# Patient Record
Sex: MALE | Race: WHITE | NOT HISPANIC OR LATINO | Employment: FULL TIME | URBAN - NONMETROPOLITAN AREA
[De-identification: names, ages, dates, MRNs, and addresses within clinical notes are randomized per-mention and may not be internally consistent; named-entity substitution may affect disease eponyms.]

---

## 2019-03-07 ENCOUNTER — TELEPHONE (OUTPATIENT)
Dept: UROLOGY | Facility: CLINIC | Age: 29
End: 2019-03-07

## 2019-03-07 ENCOUNTER — OFFICE VISIT (OUTPATIENT)
Dept: UROLOGY | Facility: CLINIC | Age: 29
End: 2019-03-07

## 2019-03-07 VITALS — WEIGHT: 260 LBS | BODY MASS INDEX: 30.7 KG/M2 | HEIGHT: 77 IN

## 2019-03-07 DIAGNOSIS — R79.89 LOW TESTOSTERONE: Primary | ICD-10-CM

## 2019-03-07 DIAGNOSIS — N52.9 ED (ERECTILE DYSFUNCTION) OF ORGANIC ORIGIN: ICD-10-CM

## 2019-03-07 PROCEDURE — 99204 OFFICE O/P NEW MOD 45 MIN: CPT | Performed by: UROLOGY

## 2019-03-07 RX ORDER — TESTOSTERONE CYPIONATE 200 MG/ML
INJECTION, SOLUTION INTRAMUSCULAR
Qty: 10 ML | Refills: 2 | Status: SHIPPED | OUTPATIENT
Start: 2019-03-07 | End: 2019-04-18 | Stop reason: SDUPTHER

## 2019-03-07 NOTE — PROGRESS NOTES
Chief Complaint:          Chief Complaint   Patient presents with   • Low Testosterone     New Patient        HPI:   28 y.o. male.  28-year-old white male referred with low testosterone.  He went to [facility] he has anxiety, panic attacks, he has 2 children he is not interested in more fertility.  He was placed on Zoloft.  His erections are essentially gone.  He tried testosterone previously and it was great but he also has a history of Winstrol in the gym.  He has anxiety and panic attacks his level was 323.  He has had eye surgery no other medical problems.  Low Testosterone this pleasant male patient presents today with signs and symptoms that are consistent with low testosterone he has positive Isaias questionnaire by history this includes both the sexual and nonsexual side effects.  Sexual side effects include inability to achieve and maintain an erection, in ability to maintain his erection and decreased interest and sexual activity.  Nonsexual symptomatology includes fatigue, difficulty completing a job, tiredness.  He has a discussion of the various forms testosterone available including parenteral, topical, and the form of a patch.  We discussed the efficacy of the gels, and the injections.  As well as the cost and benefits analysis.  We discussed the the studies a talked about heart disease and its effect on prostate cancer both of which are negligible.  He gives verbal consent to proceed with treatment.  He understands the risks and benefits of length he also completed his attempts at fertility he understands the partial effect on spermatogenesis    Past Medical History:      History reviewed. No pertinent past medical history.      Current Meds:     Current Outpatient Medications   Medication Sig Dispense Refill   • sertraline (ZOLOFT) 50 MG tablet Take 50 mg by mouth Daily.       No current facility-administered medications for this visit.         Allergies:      No Known Allergies     Past Surgical  History:     History reviewed. No pertinent surgical history.      Social History:     Social History     Socioeconomic History   • Marital status:      Spouse name: Not on file   • Number of children: Not on file   • Years of education: Not on file   • Highest education level: Not on file   Social Needs   • Financial resource strain: Not on file   • Food insecurity - worry: Not on file   • Food insecurity - inability: Not on file   • Transportation needs - medical: Not on file   • Transportation needs - non-medical: Not on file   Occupational History   • Not on file   Tobacco Use   • Smoking status: Never Smoker   • Smokeless tobacco: Never Used   Substance and Sexual Activity   • Alcohol use: Yes     Frequency: Never   • Drug use: No   • Sexual activity: Not on file   Other Topics Concern   • Not on file   Social History Narrative   • Not on file       Family History:     Family History   Problem Relation Age of Onset   • Hypertension Father    • Heart disease Father    • Cancer Father    • Hypertension Mother    • Heart disease Mother        Review of Systems:     Review of Systems   Constitutional: Negative.    HENT: Negative.    Eyes: Negative.    Respiratory: Negative.    Cardiovascular: Negative.    Gastrointestinal: Negative.    Endocrine: Negative.    Musculoskeletal: Negative.    Allergic/Immunologic: Negative.    Neurological: Negative.    Hematological: Negative.    Psychiatric/Behavioral: Negative.        Physical Exam:     Physical Exam   Constitutional: He is oriented to person, place, and time. He appears well-developed and well-nourished.   HENT:   Head: Normocephalic and atraumatic.   Eyes: Conjunctivae and EOM are normal. Pupils are equal, round, and reactive to light.   Neck: Normal range of motion.   Cardiovascular: Normal rate, regular rhythm, normal heart sounds and intact distal pulses.   Pulmonary/Chest: Effort normal and breath sounds normal.   Abdominal: Soft. Bowel sounds are  normal.   Genitourinary:   Genitourinary Comments:  soft nontender abdomen with no organomegaly, rigidity, or tenderness.  He has normal external genitalia and uncircumcised phallus with a freely movable foreskin bilaterally descended testes without masses there is no inguinal hernias adenopathy or abnormalities he had good rectal tone and a large smooth firm prostate.  There is no nodularity or any suspicious rectal abnormalities     Musculoskeletal: Normal range of motion.   Neurological: He is alert and oriented to person, place, and time. He has normal reflexes.   Skin: Skin is warm and dry.   Psychiatric: He has a normal mood and affect. His behavior is normal. Judgment and thought content normal.   Nursing note and vitals reviewed.      I have reviewed the following portions of the patient's history: allergies, current medications, past family history, past medical history, past social history, past surgical history, problem list and ROS and confirm it's accurate.      Procedure:       Assessment/Plan:   Low TestosteroneThis pleasant male patient presents today with signs and symptoms that are consistent with low testosterone he has positive Isaias questionnaire by history this includes both the sexual and nonsexual side effects.  Sexual side effects include inability to achieve and maintain an erection, in ability to maintain his erection and decreased interest and sexual activity.  Nonsexual symptomatology includes fatigue, difficulty completing a job, tiredness.  He has a discussion of the various forms testosterone available including parenteral, topical, and the form of a patch.  We discussed the efficacy of the gels, and the injections.  As well as the cost and benefits analysis.  We discussed the the studies a talked about heart disease and its effect on prostate cancer both of which are negligible.  He gives verbal consent to proceed with treatment.  He understands the risks and benefits of length he  also completed his attempts at fertility he understands the partial effect on spermatogenesis  Erectile dysfunction-we discussed the anatomy and physiology of the penis and the endothelium.  We discussed the various forms of erectile dysfunction including peripheral vascular occlusive disease, postoperative, secondary to radiation treatments of the prostate, and arterial inflow.  We discussed the various treatment options available including oral medication and its various forms.  We discussed the use of both generic and non-generic Viagra.  We discussed Cialis and a longer half-life of 17 hours as well as the other 2 medications.  We discussed cost involved with this including the fact that the generic is much cheaper but is taken has multiple pills because they are 20 mg dosages.  We did discuss the other alternatives including Penile injections, vacuum erection devices and surgical intervention reserved for only the most severe cases.  We discussed the need for testosterone in about 20% of cases of erectile dysfunction.    Patient's Body mass index is 30.83 kg/m². BMI is above normal parameters. Recommendations include: educational material.          This document has been electronically signed by ADELIA ESCOBEDO MD March 7, 2019 10:30 AM

## 2019-03-08 PROBLEM — N52.9 ED (ERECTILE DYSFUNCTION) OF ORGANIC ORIGIN: Status: ACTIVE | Noted: 2019-03-08

## 2019-03-08 PROBLEM — R79.89 LOW TESTOSTERONE: Status: ACTIVE | Noted: 2019-03-08

## 2019-03-15 ENCOUNTER — PRIOR AUTHORIZATION (OUTPATIENT)
Dept: UROLOGY | Facility: CLINIC | Age: 29
End: 2019-03-15

## 2019-03-15 NOTE — TELEPHONE ENCOUNTER
PA FOR TESTOSTERONE WAS SENT TO [facility]AN TODAY AND APPROVED.     CALLED PATIENT AND HE VOICED UNDERSTANDING.

## 2019-04-18 ENCOUNTER — OFFICE VISIT (OUTPATIENT)
Dept: UROLOGY | Facility: CLINIC | Age: 29
End: 2019-04-18

## 2019-04-18 VITALS — HEIGHT: 77 IN | BODY MASS INDEX: 30.83 KG/M2

## 2019-04-18 DIAGNOSIS — R79.89 LOW TESTOSTERONE: ICD-10-CM

## 2019-04-18 DIAGNOSIS — N62 PSEUDOGYNECOMASTIA: Primary | ICD-10-CM

## 2019-04-18 PROCEDURE — 99214 OFFICE O/P EST MOD 30 MIN: CPT | Performed by: UROLOGY

## 2019-04-18 RX ORDER — ANASTROZOLE 1 MG/1
1 TABLET ORAL WEEKLY
Qty: 12 TABLET | Refills: 2 | Status: SHIPPED | OUTPATIENT
Start: 2019-04-18 | End: 2019-11-07 | Stop reason: SDUPTHER

## 2019-04-18 RX ORDER — TESTOSTERONE CYPIONATE 200 MG/ML
INJECTION, SOLUTION INTRAMUSCULAR
Qty: 10 ML | Refills: 2 | Status: SHIPPED | OUTPATIENT
Start: 2019-04-18 | End: 2019-11-07 | Stop reason: SDUPTHER

## 2019-04-18 NOTE — PROGRESS NOTES
Chief Complaint:          Chief Complaint   Patient presents with   • Low Testosterone       HPI:   28 y.o. male referred with low testosterone.  He went to [facility] he has anxiety, panic attacks, he has 2 children he is not interested in more fertility.  He was placed on Zoloft.  His erections are essentially gone.  He tried testosterone previously and it was great but he also has a history of Winstrol in the gym.  He has anxiety and panic attacks his level was 323.  He has had eye surgery no other medical problems.  Low Testosterone this pleasant male patient presents today with signs and symptoms that are consistent with low testosterone he has positive Isaias questionnaire by history this includes both the sexual and nonsexual side effects.  Sexual side effects include inability to achieve and maintain an erection, in ability to maintain his erection and decreased interest and sexual activity.  Nonsexual symptomatology includes fatigue, difficulty completing a job, tiredness.  He has a discussion of the various forms testosterone available including parenteral, topical, and the form of a patch.  We discussed the efficacy of the gels, and the injections.  As well as the cost and benefits analysis.  We discussed the the studies a talked about heart disease and its effect on prostate cancer both of which are negligible.  He gives verbal consent to proceed with treatment.  He understands the risks and benefits of length he also completed his attempts at fertility he understands the partial effect on spermatogenesis.  He returns today.  He is actually doing great he is lost weight but now is complaining of some breast enlargement consistent with pseudogynecomastia.  I am going to get appropriate labs but I am going to start him on aerobics.  I discussed this diagnosis with him.  He continues to lose weight he has great erections he is very pleased with the treatment today      Past Medical History:      History  reviewed. No pertinent past medical history.      Current Meds:     Current Outpatient Medications   Medication Sig Dispense Refill   • sertraline (ZOLOFT) 50 MG tablet Take 50 mg by mouth Daily.     • Syringe, Disposable, 3 ML misc Use 3 ml syringe with a 25 gauge  5/8 inch needle 24 each 6   • Testosterone Cypionate (DEPO-TESTOSTERONE) 200 MG/ML injection He is to use 1/2 cc every Monday and Thursday SQ 10 mL 2     No current facility-administered medications for this visit.         Allergies:      No Known Allergies     Past Surgical History:     No past surgical history on file.      Social History:     Social History     Socioeconomic History   • Marital status:      Spouse name: Not on file   • Number of children: Not on file   • Years of education: Not on file   • Highest education level: Not on file   Tobacco Use   • Smoking status: Never Smoker   • Smokeless tobacco: Never Used   Substance and Sexual Activity   • Alcohol use: Yes     Frequency: Never   • Drug use: No       Family History:     Family History   Problem Relation Age of Onset   • Hypertension Father    • Heart disease Father    • Cancer Father    • Hypertension Mother    • Heart disease Mother        Review of Systems:     Review of Systems   Constitutional: Negative.    HENT: Negative.    Eyes: Negative.    Respiratory: Negative.    Cardiovascular: Negative.    Gastrointestinal: Negative.    Endocrine: Negative.    Musculoskeletal: Negative.    Allergic/Immunologic: Negative.    Neurological: Negative.    Hematological: Negative.    Psychiatric/Behavioral: Negative.        Physical Exam:     Physical Exam   Constitutional: He is oriented to person, place, and time. He appears well-developed and well-nourished.   HENT:   Head: Normocephalic and atraumatic.   Eyes: Conjunctivae and EOM are normal. Pupils are equal, round, and reactive to light.   Neck: Normal range of motion.   Cardiovascular: Normal rate, regular rhythm, normal heart  sounds and intact distal pulses.   Pulmonary/Chest: Effort normal and breath sounds normal.   Abdominal: Soft. Bowel sounds are normal.   Musculoskeletal: Normal range of motion.   Neurological: He is alert and oriented to person, place, and time. He has normal reflexes.   Skin: Skin is warm and dry.   Psychiatric: He has a normal mood and affect. His behavior is normal. Judgment and thought content normal.   Nursing note and vitals reviewed.      I have reviewed the following portions of the patient's history: allergies, current medications, past family history, past medical history, past social history, past surgical history, problem list and ROS and confirm it's accurate.      Procedure:       Assessment/Plan:   Low testosterone:  patient is here for follow-up.  Since beginning the medication he's been very pleased.  He reports a dramatic improvement in his erections, ability to achieve and maintain an erection, improvement in libido, increase in frequency of morning erections, a noticeable weight loss consistent with the treatment.  No development of breast problems or abnormalities.  He's going to have appropriate safety laboratory parameters checked.   He understands that the new data implicates testosterone with the development of prostate cancer and this is all but been disproven and the medical literature as well as the risks of cardiovascular disease which is actually also been disproven.  He understands that while he is a candidate for topical therapy if he is in contact with children this is not an option because it's been shown to accentuate genitalia development at an early age that this frequently irreversible.  He also understands this is a controlled substance and as such will not be prescribed without appropriate follow-up and appropriate laboratory investigation.  He understands effects on spermatogenesis including the fact that this is not always completely reversible and not always completely  limited his ability to father a child.  He has demonstrated facility in the technique of both intramuscular and subcutaneous injection.  And has been taught sterility one drawing up the medication.  Pseudogynecomastia-recommend a course of anastrozole I discussed the pathophysiology of this with the patient    Patient's Body mass index is 30.83 kg/m². BMI is above normal parameters. Recommendations include: educational material.          This document has been electronically signed by ADELIA ESCOBEDO MD April 18, 2019 9:50 AM

## 2019-04-20 PROBLEM — N62 PSEUDOGYNECOMASTIA: Status: ACTIVE | Noted: 2019-04-20

## 2019-11-07 ENCOUNTER — OFFICE VISIT (OUTPATIENT)
Dept: UROLOGY | Facility: CLINIC | Age: 29
End: 2019-11-07

## 2019-11-07 VITALS — HEIGHT: 77 IN | BODY MASS INDEX: 33.42 KG/M2 | WEIGHT: 283 LBS

## 2019-11-07 DIAGNOSIS — E29.1 HYPOGONADISM IN MALE: Primary | ICD-10-CM

## 2019-11-07 DIAGNOSIS — R79.89 LOW TESTOSTERONE: ICD-10-CM

## 2019-11-07 DIAGNOSIS — E28.0 HYPERESTROGENISM: ICD-10-CM

## 2019-11-07 LAB
DEPRECATED RDW RBC AUTO: 50.3 FL (ref 37–54)
ERYTHROCYTE [DISTWIDTH] IN BLOOD BY AUTOMATED COUNT: 13.8 % (ref 12.3–15.4)
ESTRADIOL SERPL HS-MCNC: 7.5 PG/ML
HCT VFR BLD AUTO: 49.1 % (ref 37.5–51)
HGB BLD-MCNC: 16.8 G/DL (ref 13–17.7)
MCH RBC QN AUTO: 33.9 PG (ref 26.6–33)
MCHC RBC AUTO-ENTMCNC: 34.2 G/DL (ref 31.5–35.7)
MCV RBC AUTO: 99.2 FL (ref 79–97)
PLATELET # BLD AUTO: 173 10*3/MM3 (ref 140–450)
PMV BLD AUTO: 11.8 FL (ref 6–12)
PSA SERPL-MCNC: 0.49 NG/ML (ref 0–4)
RBC # BLD AUTO: 4.95 10*6/MM3 (ref 4.14–5.8)
TESTOST SERPL-MCNC: 704 NG/DL (ref 249–836)
WBC NRBC COR # BLD: 7.07 10*3/MM3 (ref 3.4–10.8)

## 2019-11-07 PROCEDURE — 85027 COMPLETE CBC AUTOMATED: CPT | Performed by: NURSE PRACTITIONER

## 2019-11-07 PROCEDURE — 84403 ASSAY OF TOTAL TESTOSTERONE: CPT | Performed by: NURSE PRACTITIONER

## 2019-11-07 PROCEDURE — 82670 ASSAY OF TOTAL ESTRADIOL: CPT | Performed by: NURSE PRACTITIONER

## 2019-11-07 PROCEDURE — 99213 OFFICE O/P EST LOW 20 MIN: CPT | Performed by: UROLOGY

## 2019-11-07 PROCEDURE — 36415 COLL VENOUS BLD VENIPUNCTURE: CPT | Performed by: UROLOGY

## 2019-11-07 PROCEDURE — 84153 ASSAY OF PSA TOTAL: CPT | Performed by: NURSE PRACTITIONER

## 2019-11-07 RX ORDER — TESTOSTERONE CYPIONATE 200 MG/ML
INJECTION, SOLUTION INTRAMUSCULAR
Qty: 10 ML | Refills: 2 | Status: SHIPPED | OUTPATIENT
Start: 2019-11-07 | End: 2021-02-09 | Stop reason: ALTCHOICE

## 2019-11-07 RX ORDER — ANASTROZOLE 1 MG/1
1 TABLET ORAL WEEKLY
Qty: 12 TABLET | Refills: 2 | Status: SHIPPED | OUTPATIENT
Start: 2019-11-07 | End: 2021-02-09

## 2019-11-07 NOTE — PROGRESS NOTES
"Chief Complaint:          Chief Complaint   Patient presents with   • Hypogonadism     6 month f/u        HPI:   28 y.o. male returns today doing great.  He has had some weight gain and want to go ahead and make the addition of an estradiol to rule out hyperestrogenism.  Patient returns today for follow-up.  He is been on testosterone replacement therapy.  He reports a dramatic improvement in his Isaias questionnaire: -ISAIAS-androgen deficiency in the age male questionnaire  The patient was queried regarding the androgen deficiency in the age male questionnaire.  This is a validated questionnaire that was performed on a set of 314 [place] male physicians when it was positive it correlated directly with a 94% chance of low testosterone.  Patient indicates there is a decrease in libido or sex drive, a lack of energy, Decreased  strength and endurance, a decreased \"enjoyment of life\", sad and grumpy feelings with significant difficulty maintaining erections.  He is also been a recent deterioration regarding work performance.  He reports weight loss.  He has good facility and the use of subcutaneous and intramuscular injections as well as comfort level and using the medication in a sterile fashion.  He understands he should use only the prescribed dose.  He's here for appropriate lab monitoring regarding this.  He understands this is a controlled substance and therefore must be watched closely will not be refilled and the medical loss or miss calculation of the dose.  He is very happy with the treatment and therefore wants to continue it.      Past Medical History:      History reviewed. No pertinent past medical history.      Current Meds:     Current Outpatient Medications   Medication Sig Dispense Refill   • anastrozole (ARIMIDEX) 1 MG tablet Take 1 tablet by mouth 1 (One) Time Per Week. 12 tablet 2   • sertraline (ZOLOFT) 50 MG tablet Take 50 mg by mouth Daily.     • Syringe, Disposable, 3 ML misc Use 3 ml syringe " with a 25 gauge  5/8 inch needle 24 each 6   • Testosterone Cypionate (DEPO-TESTOSTERONE) 200 MG/ML injection He is to use 1/2 cc every Monday and Thursday SQ 10 mL 2     No current facility-administered medications for this visit.         Allergies:      No Known Allergies     Past Surgical History:     History reviewed. No pertinent surgical history.      Social History:     Social History     Socioeconomic History   • Marital status:      Spouse name: Not on file   • Number of children: Not on file   • Years of education: Not on file   • Highest education level: Not on file   Tobacco Use   • Smoking status: Never Smoker   • Smokeless tobacco: Never Used   Substance and Sexual Activity   • Alcohol use: Yes     Frequency: Never   • Drug use: No       Family History:     Family History   Problem Relation Age of Onset   • Hypertension Father    • Heart disease Father    • Cancer Father    • Hypertension Mother    • Heart disease Mother        Review of Systems:     Review of Systems   Constitutional: Negative.    HENT: Negative.    Eyes: Negative.    Respiratory: Negative.    Cardiovascular: Negative.    Gastrointestinal: Negative.    Endocrine: Negative.    Musculoskeletal: Negative.    Allergic/Immunologic: Negative.    Neurological: Negative.    Hematological: Negative.    Psychiatric/Behavioral: Negative.        Physical Exam:     Physical Exam   Constitutional: He is oriented to person, place, and time. He appears well-developed and well-nourished.   HENT:   Head: Normocephalic and atraumatic.   Eyes: Conjunctivae and EOM are normal. Pupils are equal, round, and reactive to light.   Neck: Normal range of motion.   Cardiovascular: Normal rate, regular rhythm, normal heart sounds and intact distal pulses.   Pulmonary/Chest: Effort normal and breath sounds normal.   Abdominal: Soft. Bowel sounds are normal.   Musculoskeletal: Normal range of motion.   Neurological: He is alert and oriented to person, place,  and time. He has normal reflexes.   Skin: Skin is warm and dry.   Psychiatric: He has a normal mood and affect. His behavior is normal. Judgment and thought content normal.   Nursing note and vitals reviewed.      I have reviewed the following portions of the patient's history: allergies, current medications, past family history, past medical history, past social history, past surgical history, problem list and ROS and confirm it's accurate.      Procedure:       Assessment/Plan:   Low testosterone:  patient is here for follow-up.  Since beginning the medication he's been very pleased.  He reports a dramatic improvement in his erections, ability to achieve and maintain an erection, improvement in libido, increase in frequency of morning erections, a noticeable weight loss consistent with the treatment.  No development of breast problems or abnormalities.  He's going to have appropriate safety laboratory parameters checked.   He understands that the new data implicates testosterone with the development of prostate cancer and this is all but been disproven and the medical literature as well as the risks of cardiovascular disease which is actually also been disproven.  He understands that while he is a candidate for topical therapy if he is in contact with children this is not an option because it's been shown to accentuate genitalia development at an early age that this frequently irreversible.  He also understands this is a controlled substance and as such will not be prescribed without appropriate follow-up and appropriate laboratory investigation.  He understands effects on spermatogenesis including the fact that this is not always completely reversible and not always completely limited his ability to father a child.  He has demonstrated facility in the technique of both intramuscular and subcutaneous injection.  And has been taught sterility one drawing up the medication.  Hyperestrogenism-we spoke about the role  of estrogen metabolism and breakdown in the  presence of testosterone replacement therapy.  We spoke about how high estradiol levels can interfere with the improvement noted in a man on testosterone as well as significant side effects such as pseudogynecomastia.  We discussed the use of the medication arimidex using a very judicious low-dose fashion to prevent too low of an estradiol which would precipitate bone complications.            Patient's Body mass index is 30.83 kg/m². BMI is above normal parameters. Recommendations include: educational material.              This document has been electronically signed by ADELIA ESCOBEDO MD November 7, 2019 9:25 AM

## 2019-11-08 PROBLEM — E28.0 HYPERESTROGENISM: Status: ACTIVE | Noted: 2019-11-08

## 2021-02-09 ENCOUNTER — OFFICE VISIT (OUTPATIENT)
Dept: CARDIOLOGY | Facility: CLINIC | Age: 31
End: 2021-02-09

## 2021-02-09 VITALS
TEMPERATURE: 98 F | DIASTOLIC BLOOD PRESSURE: 75 MMHG | SYSTOLIC BLOOD PRESSURE: 131 MMHG | BODY MASS INDEX: 33.23 KG/M2 | HEART RATE: 67 BPM | WEIGHT: 281.4 LBS | HEIGHT: 77 IN

## 2021-02-09 DIAGNOSIS — R07.2 PRECORDIAL PAIN: Primary | ICD-10-CM

## 2021-02-09 DIAGNOSIS — E78.1 HYPERTRIGLYCERIDEMIA: ICD-10-CM

## 2021-02-09 DIAGNOSIS — R42 DIZZINESS: ICD-10-CM

## 2021-02-09 DIAGNOSIS — I10 ESSENTIAL HYPERTENSION: ICD-10-CM

## 2021-02-09 PROCEDURE — 93000 ELECTROCARDIOGRAM COMPLETE: CPT | Performed by: INTERNAL MEDICINE

## 2021-02-09 PROCEDURE — 99203 OFFICE O/P NEW LOW 30 MIN: CPT | Performed by: INTERNAL MEDICINE

## 2021-02-09 RX ORDER — LOSARTAN POTASSIUM 100 MG/1
100 TABLET ORAL DAILY
COMMUNITY
Start: 2021-02-05

## 2021-02-09 RX ORDER — ATORVASTATIN CALCIUM 20 MG/1
20 TABLET, FILM COATED ORAL EVERY EVENING
COMMUNITY
Start: 2021-01-14

## 2021-02-09 RX ORDER — ALPRAZOLAM 1 MG/1
TABLET ORAL
COMMUNITY
Start: 2020-12-23

## 2021-02-09 RX ORDER — ICOSAPENT ETHYL 1000 MG/1
2 CAPSULE ORAL 2 TIMES DAILY
COMMUNITY
Start: 2021-01-29

## 2021-02-09 NOTE — PROGRESS NOTES
Danielle Torres APRN  Mainor Max  1990 02/09/2021    Dear Danielle:    Subjective     Mainor Max is a 30 y.o. male with the problems as listed above, presents    Chief complaint: Recurrent chest pains for about 6 months.    History of Present Illness: Mr. Max is a pleasant 28-year-old  male with no history of known heart disease or coronary artery disease, has recent onset hypertension, nondiabetic, history of hypertriglyceridemia and a positive family history of heart disease, presents with complaints of having recurrent chest pains over the last 6 months or so.  He described these episodes as being felt as tightness in the precordial area with some radiation to the left arm with some associated shortness of breath.  These occur mostly when he gets stressed out.  He also relates episodes of having dizziness and near syncope mostly when he gets anxious and stressed out.  He had an episode recently after a bowel movement and was thought to be a vasovagal.  He denies any significant dyspnea, PND, orthopnea pedal edema. He denies any palpitations as such.  He is a social smoker and smokes only occasionally.  He apparently has some history of anxiety for which she has been on sertraline which she seems to help his symptoms.     No Known Allergies:      Current Outpatient Medications:   •  ALPRAZolam (XANAX) 1 MG tablet, TAKE 1 TABLET BY MOUTH EVERY 12-24 HOURS AS NEEDED, Disp: , Rfl:   •  atorvastatin (LIPITOR) 20 MG tablet, Take 20 mg by mouth Every Evening., Disp: , Rfl:   •  losartan (COZAAR) 100 MG tablet, Take 100 mg by mouth Daily., Disp: , Rfl:   •  sertraline (ZOLOFT) 50 MG tablet, Take 50 mg by mouth Daily., Disp: , Rfl:   •  Vascepa 1 g capsule capsule, Take 2 capsules by mouth 2 (Two) Times a Day., Disp: , Rfl:     Past Medical History:   Diagnosis Date   • Hyperestrogenism 11/8/2019   • Hyperlipidemia    • Hypertension      History reviewed. No pertinent surgical  "history.  Family History   Problem Relation Age of Onset   • Hypertension Father    • Heart disease Father    • Cancer Father    • Hypertension Mother    • Heart disease Mother      Social History     Tobacco Use   • Smoking status: Never Smoker   • Smokeless tobacco: Never Used   Substance Use Topics   • Alcohol use: Yes     Frequency: Never   • Drug use: No       Review of Systems   Constitution: Positive for malaise/fatigue.   HENT: Negative.    Eyes: Positive for blurred vision.   Cardiovascular: Negative.    Respiratory: Negative.    Endocrine: Negative.    Hematologic/Lymphatic:        SLOW TO HEAL AFTER CUTS   Skin: Negative.    Musculoskeletal: Negative.    Gastrointestinal: Positive for abdominal pain, bowel incontinence, diarrhea, nausea and vomiting.   Genitourinary: Negative.    Neurological: Negative.    Psychiatric/Behavioral: Positive for depression. The patient is nervous/anxious.    Allergic/Immunologic: Negative.      Objective   Blood pressure 131/75, pulse 67, temperature 98 °F (36.7 °C), height 195.6 cm (77\"), weight 128 kg (281 lb 6.4 oz).  Body mass index is 33.37 kg/m².    Vitals signs reviewed.   Constitutional:       Appearance: Well-developed.   Eyes:      Conjunctiva/sclera: Conjunctivae normal.   HENT:      Head: Normocephalic.   Neck:      Musculoskeletal: Normal range of motion and neck supple.      Thyroid: No thyromegaly.      Vascular: No JVD.      Trachea: No tracheal deviation.   Pulmonary:      Effort: No respiratory distress.      Breath sounds: Normal breath sounds. No wheezing. No rales.   Cardiovascular:      PMI at left midclavicular line. Normal rate. Regular rhythm. Normal S1. Normal S2.      Murmurs: There is no murmur.      No gallop. No click. No rub.   Pulses:     Intact distal pulses.   Edema:     Peripheral edema absent.   Abdominal:      General: Bowel sounds are normal.      Palpations: Abdomen is soft. There is no abdominal mass.      Tenderness: There is no " abdominal tenderness.   Skin:     General: Skin is warm and dry.   Neurological:      Mental Status: Alert and oriented to person, place, and time.      Cranial Nerves: No cranial nerve deficit.         No results found for: NA, K, CL, CO2, BUN, CREATININE, LABGLOM, GLUCOSE, CALCIUM, AST, ALT, ALKPHOS, LABIL2  No results found for: CKTOTAL  Lab Results   Component Value Date    WBC 7.07 11/07/2019    HGB 16.8 11/07/2019    HCT 49.1 11/07/2019     11/07/2019       Lab Results   Component Value Date    PSA 0.491 11/07/2019           ECG 12 Lead    Date/Time: 2/9/2021 2:48 PM  Performed by: Franck Verde MD  Authorized by: Franck Verde MD   Previous ECG: no previous ECG available  Rhythm: sinus rhythm  BPM: 71  Conduction: conduction normal  ST Segments: ST segments normal  T Waves: T waves normal  Other: no other findings    Clinical impression: normal ECG  Comments: QTc: 360 ms.              Assessment/Plan :   Diagnosis Plan   1. Precordial pain, somewhat atypical.  Treadmill Stress Test   2. Essential hypertension     3. Hypertriglyceridemia     4. Dizziness       Recommendations:  Orders Placed This Encounter   Procedures   • Treadmill Stress Test   • ECG 12 Lead      1. We will evaluate his chest pains with a treadmill stress EKG test.  2. If he has any recurrent episodes of dizziness or near syncope, will consider further evaluation with event monitor.    Return in about 2 weeks (around 2/23/2021).    As always, Aleydandia I appreciate very much the opportunity to participate in the cardiovascular care of your patients.      With Best Regards,    Franck Vedre MD, [facility]    Dragon disclaimer:  Much of this encounter note is an electronic transcription/translation of spoken language to printed text. The electronic translation of spoken language may permit erroneous, or at times, nonsensical words or phrases to be inadvertently transcribed; Although I have reviewed the note for such errors, some may  still exist.

## 2021-02-17 ENCOUNTER — HOSPITAL ENCOUNTER (OUTPATIENT)
Dept: CARDIOLOGY | Facility: HOSPITAL | Age: 31
Discharge: HOME | End: 2021-02-17
Admitting: INTERNAL MEDICINE

## 2021-02-17 DIAGNOSIS — R07.2 PRECORDIAL PAIN: ICD-10-CM

## 2021-02-17 PROCEDURE — 93018 CV STRESS TEST I&R ONLY: CPT | Performed by: INTERNAL MEDICINE

## 2021-02-17 PROCEDURE — 93017 CV STRESS TEST TRACING ONLY: CPT

## 2021-02-18 LAB
BH CV STRESS BP STAGE 1: NORMAL
BH CV STRESS BP STAGE 2: NORMAL
BH CV STRESS BP STAGE 3: NORMAL
BH CV STRESS DURATION MIN STAGE 1: 3
BH CV STRESS DURATION MIN STAGE 2: 3
BH CV STRESS DURATION MIN STAGE 3: 2
BH CV STRESS DURATION SEC STAGE 1: 0
BH CV STRESS DURATION SEC STAGE 2: 0
BH CV STRESS DURATION SEC STAGE 3: 0
BH CV STRESS GRADE STAGE 1: 10
BH CV STRESS GRADE STAGE 2: 12
BH CV STRESS GRADE STAGE 3: 14
BH CV STRESS HR STAGE 1: 120
BH CV STRESS HR STAGE 2: 144
BH CV STRESS HR STAGE 3: 162
BH CV STRESS METS STAGE 1: 5
BH CV STRESS METS STAGE 2: 7.5
BH CV STRESS METS STAGE 3: 10
BH CV STRESS PROTOCOL 1: NORMAL
BH CV STRESS RECOVERY BP: NORMAL MMHG
BH CV STRESS RECOVERY HR: 93 BPM
BH CV STRESS SPEED STAGE 1: 1.7
BH CV STRESS SPEED STAGE 2: 2.5
BH CV STRESS SPEED STAGE 3: 3.4
BH CV STRESS STAGE 1: 1
BH CV STRESS STAGE 2: 2
BH CV STRESS STAGE 3: 3
MAXIMAL PREDICTED HEART RATE: 188 BPM
PERCENT MAX PREDICTED HR: 86.17 %
STRESS BASELINE BP: NORMAL MMHG
STRESS BASELINE HR: 82 BPM
STRESS PERCENT HR: 101 %
STRESS POST ESTIMATED WORKLOAD: 10.1 METS
STRESS POST EXERCISE DUR MIN: 8 MIN
STRESS POST PEAK BP: NORMAL MMHG
STRESS POST PEAK HR: 162 BPM
STRESS TARGET HR: 160 BPM

## 2021-02-23 ENCOUNTER — OFFICE VISIT (OUTPATIENT)
Dept: SURGERY | Facility: CLINIC | Age: 31
End: 2021-02-23

## 2021-02-23 VITALS
DIASTOLIC BLOOD PRESSURE: 80 MMHG | HEIGHT: 77 IN | SYSTOLIC BLOOD PRESSURE: 130 MMHG | WEIGHT: 281 LBS | BODY MASS INDEX: 33.18 KG/M2

## 2021-02-23 DIAGNOSIS — K59.1 FUNCTIONAL DIARRHEA: ICD-10-CM

## 2021-02-23 DIAGNOSIS — K62.5 RECTAL BLEEDING: ICD-10-CM

## 2021-02-23 DIAGNOSIS — K82.8 DYSFUNCTIONAL GALLBLADDER: Primary | ICD-10-CM

## 2021-02-23 DIAGNOSIS — Z01.818 PREOP TESTING: Primary | ICD-10-CM

## 2021-02-23 PROCEDURE — 99204 OFFICE O/P NEW MOD 45 MIN: CPT | Performed by: SURGERY

## 2021-02-23 RX ORDER — POLYETHYLENE GLYCOL 3350, SODIUM SULFATE ANHYDROUS, SODIUM BICARBONATE, SODIUM CHLORIDE, POTASSIUM CHLORIDE 236; 22.74; 6.74; 5.86; 2.97 G/4L; G/4L; G/4L; G/4L; G/4L
4 POWDER, FOR SOLUTION ORAL ONCE
Qty: 1 ML | Refills: 0 | Status: SHIPPED | OUTPATIENT
Start: 2021-02-23 | End: 2021-02-23

## 2021-02-23 NOTE — PROGRESS NOTES
Subjective   Mainor Max is a 30 y.o. male.     Chief Complaint: diarrhea, rectal bleeding    History of Present Illness He has had diarrhea for about 6 months. He has bright red blood with some bowel movements. His symptoms are worse with greasy foods and he has pain then as well. HIDA EF was 12%.     The following portions of the patient's history were reviewed and updated as appropriate: current medications, past family history, past medical history, past social history, past surgical history and problem list.    Review of Systems   Constitutional: Negative for activity change, appetite change, chills, fever and unexpected weight change.   HENT: Negative for congestion, facial swelling and sore throat.    Eyes: Negative for photophobia and visual disturbance.   Respiratory: Negative for chest tightness, shortness of breath and wheezing.    Cardiovascular: Negative for chest pain, palpitations and leg swelling.   Gastrointestinal: Positive for abdominal pain, anal bleeding and diarrhea. Negative for abdominal distention, blood in stool, constipation, nausea, rectal pain and vomiting.   Endocrine: Negative for cold intolerance, heat intolerance, polydipsia and polyuria.   Genitourinary: Negative for difficulty urinating, dysuria, flank pain and urgency.   Musculoskeletal: Negative for back pain and myalgias.   Skin: Negative for rash and wound.   Allergic/Immunologic: Negative for immunocompromised state.   Neurological: Negative for dizziness, seizures, syncope, light-headedness, numbness and headaches.   Hematological: Negative for adenopathy. Does not bruise/bleed easily.   Psychiatric/Behavioral: Negative for behavioral problems and confusion. The patient is not nervous/anxious.        Objective   Physical Exam  Vitals signs reviewed.   Constitutional:       General: He is not in acute distress.     Appearance: He is well-developed. He is not ill-appearing.   HENT:      Head: Normocephalic. No laceration.  Hair is normal.      Right Ear: Hearing and ear canal normal.      Left Ear: Hearing and ear canal normal.      Nose: Nose normal.      Right Sinus: No maxillary sinus tenderness or frontal sinus tenderness.      Left Sinus: No maxillary sinus tenderness or frontal sinus tenderness.   Eyes:      General: Lids are normal.      Conjunctiva/sclera: Conjunctivae normal.      Pupils: Pupils are equal, round, and reactive to light.   Neck:      Musculoskeletal: Normal range of motion.      Thyroid: No thyroid mass or thyromegaly.      Vascular: No JVD.      Trachea: No tracheal tenderness or tracheal deviation.   Cardiovascular:      Rate and Rhythm: Normal rate and regular rhythm.      Heart sounds: No murmur. No gallop.    Pulmonary:      Effort: Pulmonary effort is normal.      Breath sounds: Normal breath sounds. No stridor. No wheezing.   Chest:      Chest wall: No tenderness.   Abdominal:      General: Bowel sounds are normal. There is no distension.      Palpations: Abdomen is soft. There is no mass.      Tenderness: There is no abdominal tenderness. There is no guarding or rebound.      Hernia: No hernia is present.   Musculoskeletal:         General: No deformity.   Lymphadenopathy:      Cervical: No cervical adenopathy.      Upper Body:      Right upper body: No supraclavicular adenopathy.      Left upper body: No supraclavicular adenopathy.   Skin:     General: Skin is warm and dry.      Coloration: Skin is not pale.      Findings: No erythema or rash.   Neurological:      Mental Status: He is alert and oriented to person, place, and time.      Motor: No abnormal muscle tone.   Psychiatric:         Behavior: Behavior normal.         Thought Content: Thought content normal.         Assessment/Plan   Diagnoses and all orders for this visit:    1. Dysfunctional gallbladder (Primary)    2. Rectal bleeding    3. Functional diarrhea    lap iris and colonoscopy

## 2021-03-02 ENCOUNTER — OFFICE VISIT (OUTPATIENT)
Dept: CARDIOLOGY | Facility: CLINIC | Age: 31
End: 2021-03-02

## 2021-03-02 VITALS
WEIGHT: 280.2 LBS | TEMPERATURE: 95 F | DIASTOLIC BLOOD PRESSURE: 82 MMHG | SYSTOLIC BLOOD PRESSURE: 139 MMHG | HEART RATE: 81 BPM | HEIGHT: 77 IN | BODY MASS INDEX: 33.09 KG/M2

## 2021-03-02 DIAGNOSIS — I10 ESSENTIAL HYPERTENSION: ICD-10-CM

## 2021-03-02 DIAGNOSIS — R07.2 PRECORDIAL PAIN: Primary | ICD-10-CM

## 2021-03-02 PROCEDURE — 99213 OFFICE O/P EST LOW 20 MIN: CPT | Performed by: NURSE PRACTITIONER

## 2021-03-02 NOTE — PROGRESS NOTES
Danielle Torres, APRN  Mainor Max  1990 03/02/2021    Patient Active Problem List   Diagnosis   • Low testosterone   • ED (erectile dysfunction) of organic origin   • Pseudogynecomastia   • Hyperestrogenism   • Dysfunctional gallbladder   • Rectal bleeding   • Functional diarrhea       Dear Danielle Torres, APRN:    Subjective     Chief Complaint   Patient presents with   • Follow-up     STRES RESULTS   • Med Management           History of Present Illness:    Mainor Max is a 30 y.o. male with a past medical history of chest pains dizziness.  He was evaluated further with a treadmill stress test.  He achieved 10.1 METS workload.  An 86% of his target heart rate.  ECG revealed no evidence of myocardial ischemia.  The patient reports since his last visit he has been placed on sertraline and his symptoms have completely resolved.  He attributes the symptoms to anxiety.  He denies any further chest pains.  Previously he had had an episode of near syncope and lightheadedness but has not had any further symptoms.  He is requesting cardiac clearance for laparoscopic cholecystectomy.          No Known Allergies:      Current Outpatient Medications:   •  ALPRAZolam (XANAX) 1 MG tablet, TAKE 1 TABLET BY MOUTH EVERY 12-24 HOURS AS NEEDED, Disp: , Rfl:   •  atorvastatin (LIPITOR) 20 MG tablet, Take 20 mg by mouth Every Evening., Disp: , Rfl:   •  losartan (COZAAR) 100 MG tablet, Take 100 mg by mouth Daily., Disp: , Rfl:   •  sertraline (ZOLOFT) 50 MG tablet, Take 50 mg by mouth Daily., Disp: , Rfl:   •  Vascepa 1 g capsule capsule, Take 2 capsules by mouth 2 (Two) Times a Day., Disp: , Rfl:       The following portions of the patient's history were reviewed and updated as appropriate: allergies, current medications, past family history, past medical history, past social history, past surgical history and problem list.    Social History     Tobacco Use   • Smoking status: Never Smoker   • Smokeless  "tobacco: Never Used   Substance Use Topics   • Alcohol use: Yes     Frequency: Never   • Drug use: No       Review of Systems   Constitution: Negative for decreased appetite and malaise/fatigue.   Cardiovascular: Negative for chest pain, dyspnea on exertion, irregular heartbeat, leg swelling, near-syncope, orthopnea, palpitations, paroxysmal nocturnal dyspnea and syncope.   Respiratory: Negative for cough, shortness of breath and wheezing.    Neurological: Negative for dizziness, light-headedness and weakness.       Objective   Vitals:    03/02/21 1500   BP: 139/82   Pulse: 81   Temp: 95 °F (35 °C)   Weight: 127 kg (280 lb 3.2 oz)   Height: 195.6 cm (77\")     Body mass index is 33.23 kg/m².        Vitals signs reviewed.   Constitutional:       Appearance: Healthy appearance. Well-developed and not in distress.   HENT:      Head: Normocephalic and atraumatic.   Pulmonary:      Effort: Pulmonary effort is normal.      Breath sounds: Normal breath sounds. No wheezing. No rales.   Cardiovascular:      Normal rate. Regular rhythm.      Murmurs: There is no murmur.      . No S3 and S4 gallop.   Edema:     Peripheral edema absent.   Abdominal:      General: Bowel sounds are normal.      Palpations: Abdomen is soft.   Skin:     General: Skin is warm and dry.   Neurological:      Mental Status: Alert, oriented to person, place, and time and oriented to person, place and time.   Psychiatric:         Mood and Affect: Mood normal.         Behavior: Behavior normal.          Lab Results   Component Value Date    WBC 7.07 11/07/2019    HGB 16.8 11/07/2019    HCT 49.1 11/07/2019     11/07/2019      Lab Results   Component Value Date    PSA 0.491 11/07/2019             Procedures      Assessment/Plan    Diagnosis Plan   1. Precordial pain, somewhat atypical.     2. Essential hypertension                  Recommendations:    1. I have reviewed the results of the treadmill stress test with the patient today.  Since his " symptoms have completely resolved, we will continue to monitor.  2. If he has any further episodes of dizziness, lightheadedness, near-syncope, or palpitations I asked him to let us know.  Would proceed with a Holter monitor at that time.  He is agreeable.  3. I discussed this plan of care with Dr. Verde and he has cleared him for the laparoscopic cholecystectomy from a cardiac standpoint.  4. Follow-up in 3 months or sooner if needed.      Return in about 3 months (around 6/2/2021) for Recheck.    As always, I appreciate very much the opportunity to participate in the cardiovascular care of your patients.      With Best Regards,    JAYDA Rosa

## 2021-03-05 ENCOUNTER — TELEPHONE (OUTPATIENT)
Dept: SURGERY | Facility: CLINIC | Age: 31
End: 2021-03-05

## 2021-03-05 NOTE — TELEPHONE ENCOUNTER
PAT Wednesday 3/10 @ 2:15 pm  Covid testing after PAT  Clear liquid diet all day Thursday and bowel prep that evening.  NPO for surgery and have a  with him on day of surgery.

## 2021-03-10 ENCOUNTER — LAB (OUTPATIENT)
Dept: LAB | Facility: HOSPITAL | Age: 31
End: 2021-03-10

## 2021-03-10 ENCOUNTER — APPOINTMENT (OUTPATIENT)
Dept: PREADMISSION TESTING | Facility: HOSPITAL | Age: 31
End: 2021-03-10

## 2021-03-10 DIAGNOSIS — Z01.818 PREOP TESTING: ICD-10-CM

## 2021-03-10 PROCEDURE — C9803 HOPD COVID-19 SPEC COLLECT: HCPCS

## 2021-03-10 PROCEDURE — U0004 COV-19 TEST NON-CDC HGH THRU: HCPCS

## 2021-03-11 LAB — SARS-COV-2 RNA NOSE QL NAA+PROBE: NOT DETECTED

## 2021-03-12 ENCOUNTER — ANESTHESIA EVENT (OUTPATIENT)
Dept: PERIOP | Facility: HOSPITAL | Age: 31
End: 2021-03-12

## 2021-03-12 ENCOUNTER — ANESTHESIA (OUTPATIENT)
Dept: PERIOP | Facility: HOSPITAL | Age: 31
End: 2021-03-12

## 2021-03-12 ENCOUNTER — HOSPITAL ENCOUNTER (OUTPATIENT)
Facility: HOSPITAL | Age: 31
Setting detail: HOSPITAL OUTPATIENT SURGERY
Discharge: HOME | End: 2021-03-12
Attending: SURGERY | Admitting: NURSE ANESTHETIST, CERTIFIED REGISTERED

## 2021-03-12 VITALS
DIASTOLIC BLOOD PRESSURE: 80 MMHG | TEMPERATURE: 97.7 F | OXYGEN SATURATION: 97 % | BODY MASS INDEX: 32.73 KG/M2 | HEIGHT: 77 IN | RESPIRATION RATE: 14 BRPM | WEIGHT: 277.2 LBS | HEART RATE: 72 BPM | SYSTOLIC BLOOD PRESSURE: 123 MMHG

## 2021-03-12 DIAGNOSIS — K82.8 DYSFUNCTIONAL GALLBLADDER: ICD-10-CM

## 2021-03-12 DIAGNOSIS — K62.5 RECTAL BLEEDING: ICD-10-CM

## 2021-03-12 DIAGNOSIS — K59.1 FUNCTIONAL DIARRHEA: ICD-10-CM

## 2021-03-12 PROCEDURE — 47562 LAPAROSCOPIC CHOLECYSTECTOMY: CPT | Performed by: SURGERY

## 2021-03-12 PROCEDURE — 25010000002 HYDROMORPHONE PER 4 MG: Performed by: NURSE ANESTHETIST, CERTIFIED REGISTERED

## 2021-03-12 PROCEDURE — 25010000002 FENTANYL CITRATE (PF) 100 MCG/2ML SOLUTION: Performed by: NURSE ANESTHETIST, CERTIFIED REGISTERED

## 2021-03-12 PROCEDURE — 25010000002 DROPERIDOL PER 5 MG: Performed by: NURSE ANESTHETIST, CERTIFIED REGISTERED

## 2021-03-12 PROCEDURE — 25010000002 DEXAMETHASONE PER 1 MG: Performed by: NURSE ANESTHETIST, CERTIFIED REGISTERED

## 2021-03-12 PROCEDURE — 25010000002 PROPOFOL 10 MG/ML EMULSION: Performed by: NURSE ANESTHETIST, CERTIFIED REGISTERED

## 2021-03-12 PROCEDURE — 25010000002 NEOSTIGMINE 10 MG/10ML SOLUTION: Performed by: NURSE ANESTHETIST, CERTIFIED REGISTERED

## 2021-03-12 PROCEDURE — 45378 DIAGNOSTIC COLONOSCOPY: CPT | Performed by: SURGERY

## 2021-03-12 PROCEDURE — 25010000002 MIDAZOLAM PER 1 MG: Performed by: NURSE ANESTHETIST, CERTIFIED REGISTERED

## 2021-03-12 PROCEDURE — 25010000002 ONDANSETRON PER 1 MG: Performed by: NURSE ANESTHETIST, CERTIFIED REGISTERED

## 2021-03-12 PROCEDURE — 25010000002 MIDAZOLAM PER 1MG: Performed by: ANESTHESIOLOGY

## 2021-03-12 DEVICE — LIGACLIP 10-M/L, 10MM ENDOSCOPIC ROTATING MULTIPLE CLIP APPLIERS
Type: IMPLANTABLE DEVICE | Site: BILE DUCT | Status: FUNCTIONAL
Brand: LIGACLIP

## 2021-03-12 RX ORDER — HYDROCODONE BITARTRATE AND ACETAMINOPHEN 5; 325 MG/1; MG/1
1 TABLET ORAL EVERY 4 HOURS PRN
Status: DISCONTINUED | OUTPATIENT
Start: 2021-03-12 | End: 2021-03-12 | Stop reason: HOSPADM

## 2021-03-12 RX ORDER — DEXAMETHASONE SODIUM PHOSPHATE 4 MG/ML
INJECTION, SOLUTION INTRA-ARTICULAR; INTRALESIONAL; INTRAMUSCULAR; INTRAVENOUS; SOFT TISSUE AS NEEDED
Status: DISCONTINUED | OUTPATIENT
Start: 2021-03-12 | End: 2021-03-12 | Stop reason: SURG

## 2021-03-12 RX ORDER — FENTANYL CITRATE 50 UG/ML
50 INJECTION, SOLUTION INTRAMUSCULAR; INTRAVENOUS
Status: DISCONTINUED | OUTPATIENT
Start: 2021-03-12 | End: 2021-03-12 | Stop reason: HOSPADM

## 2021-03-12 RX ORDER — MIDAZOLAM HYDROCHLORIDE 1 MG/ML
2 INJECTION INTRAMUSCULAR; INTRAVENOUS
Status: COMPLETED | OUTPATIENT
Start: 2021-03-12 | End: 2021-03-12

## 2021-03-12 RX ORDER — DROPERIDOL 2.5 MG/ML
0.62 INJECTION, SOLUTION INTRAMUSCULAR; INTRAVENOUS ONCE AS NEEDED
Status: COMPLETED | OUTPATIENT
Start: 2021-03-12 | End: 2021-03-12

## 2021-03-12 RX ORDER — SODIUM CHLORIDE, SODIUM LACTATE, POTASSIUM CHLORIDE, CALCIUM CHLORIDE 600; 310; 30; 20 MG/100ML; MG/100ML; MG/100ML; MG/100ML
100 INJECTION, SOLUTION INTRAVENOUS ONCE AS NEEDED
Status: DISCONTINUED | OUTPATIENT
Start: 2021-03-12 | End: 2021-03-12 | Stop reason: HOSPADM

## 2021-03-12 RX ORDER — MIDAZOLAM HYDROCHLORIDE 1 MG/ML
INJECTION INTRAMUSCULAR; INTRAVENOUS AS NEEDED
Status: DISCONTINUED | OUTPATIENT
Start: 2021-03-12 | End: 2021-03-12 | Stop reason: SURG

## 2021-03-12 RX ORDER — BUPIVACAINE HYDROCHLORIDE AND EPINEPHRINE 2.5; 5 MG/ML; UG/ML
INJECTION, SOLUTION EPIDURAL; INFILTRATION; INTRACAUDAL; PERINEURAL AS NEEDED
Status: DISCONTINUED | OUTPATIENT
Start: 2021-03-12 | End: 2021-03-12 | Stop reason: HOSPADM

## 2021-03-12 RX ORDER — SODIUM CHLORIDE 9 MG/ML
INJECTION, SOLUTION INTRAVENOUS AS NEEDED
Status: DISCONTINUED | OUTPATIENT
Start: 2021-03-12 | End: 2021-03-12 | Stop reason: HOSPADM

## 2021-03-12 RX ORDER — SODIUM CHLORIDE, SODIUM LACTATE, POTASSIUM CHLORIDE, CALCIUM CHLORIDE 600; 310; 30; 20 MG/100ML; MG/100ML; MG/100ML; MG/100ML
INJECTION, SOLUTION INTRAVENOUS CONTINUOUS PRN
Status: DISCONTINUED | OUTPATIENT
Start: 2021-03-12 | End: 2021-03-12 | Stop reason: SURG

## 2021-03-12 RX ORDER — ONDANSETRON 2 MG/ML
4 INJECTION INTRAMUSCULAR; INTRAVENOUS AS NEEDED
Status: DISCONTINUED | OUTPATIENT
Start: 2021-03-12 | End: 2021-03-12 | Stop reason: HOSPADM

## 2021-03-12 RX ORDER — MIDAZOLAM HYDROCHLORIDE 1 MG/ML
1 INJECTION INTRAMUSCULAR; INTRAVENOUS
Status: COMPLETED | OUTPATIENT
Start: 2021-03-12 | End: 2021-03-12

## 2021-03-12 RX ORDER — MAGNESIUM HYDROXIDE 1200 MG/15ML
LIQUID ORAL AS NEEDED
Status: DISCONTINUED | OUTPATIENT
Start: 2021-03-12 | End: 2021-03-12 | Stop reason: HOSPADM

## 2021-03-12 RX ORDER — MEPERIDINE HYDROCHLORIDE 25 MG/ML
12.5 INJECTION INTRAMUSCULAR; INTRAVENOUS; SUBCUTANEOUS
Status: DISCONTINUED | OUTPATIENT
Start: 2021-03-12 | End: 2021-03-12 | Stop reason: HOSPADM

## 2021-03-12 RX ORDER — FENTANYL CITRATE 50 UG/ML
INJECTION, SOLUTION INTRAMUSCULAR; INTRAVENOUS AS NEEDED
Status: DISCONTINUED | OUTPATIENT
Start: 2021-03-12 | End: 2021-03-12 | Stop reason: SURG

## 2021-03-12 RX ORDER — SODIUM CHLORIDE 0.9 % (FLUSH) 0.9 %
10 SYRINGE (ML) INJECTION EVERY 12 HOURS SCHEDULED
Status: DISCONTINUED | OUTPATIENT
Start: 2021-03-12 | End: 2021-03-12 | Stop reason: HOSPADM

## 2021-03-12 RX ORDER — SODIUM CHLORIDE 0.9 % (FLUSH) 0.9 %
10 SYRINGE (ML) INJECTION AS NEEDED
Status: DISCONTINUED | OUTPATIENT
Start: 2021-03-12 | End: 2021-03-12 | Stop reason: HOSPADM

## 2021-03-12 RX ORDER — OXYCODONE HYDROCHLORIDE AND ACETAMINOPHEN 5; 325 MG/1; MG/1
1 TABLET ORAL ONCE AS NEEDED
Status: DISCONTINUED | OUTPATIENT
Start: 2021-03-12 | End: 2021-03-12 | Stop reason: HOSPADM

## 2021-03-12 RX ORDER — PROPOFOL 10 MG/ML
VIAL (ML) INTRAVENOUS AS NEEDED
Status: DISCONTINUED | OUTPATIENT
Start: 2021-03-12 | End: 2021-03-12 | Stop reason: SURG

## 2021-03-12 RX ORDER — SODIUM CHLORIDE, SODIUM LACTATE, POTASSIUM CHLORIDE, CALCIUM CHLORIDE 600; 310; 30; 20 MG/100ML; MG/100ML; MG/100ML; MG/100ML
125 INJECTION, SOLUTION INTRAVENOUS ONCE
Status: COMPLETED | OUTPATIENT
Start: 2021-03-12 | End: 2021-03-12

## 2021-03-12 RX ORDER — HYDROMORPHONE HCL 110MG/55ML
PATIENT CONTROLLED ANALGESIA SYRINGE INTRAVENOUS AS NEEDED
Status: DISCONTINUED | OUTPATIENT
Start: 2021-03-12 | End: 2021-03-12 | Stop reason: SURG

## 2021-03-12 RX ORDER — ONDANSETRON 2 MG/ML
INJECTION INTRAMUSCULAR; INTRAVENOUS AS NEEDED
Status: DISCONTINUED | OUTPATIENT
Start: 2021-03-12 | End: 2021-03-12 | Stop reason: SURG

## 2021-03-12 RX ORDER — GLYCOPYRROLATE 0.2 MG/ML
INJECTION INTRAMUSCULAR; INTRAVENOUS AS NEEDED
Status: DISCONTINUED | OUTPATIENT
Start: 2021-03-12 | End: 2021-03-12 | Stop reason: SURG

## 2021-03-12 RX ORDER — KETOROLAC TROMETHAMINE 30 MG/ML
30 INJECTION, SOLUTION INTRAMUSCULAR; INTRAVENOUS EVERY 6 HOURS PRN
Status: DISCONTINUED | OUTPATIENT
Start: 2021-03-12 | End: 2021-03-12 | Stop reason: HOSPADM

## 2021-03-12 RX ORDER — NEOSTIGMINE METHYLSULFATE 1 MG/ML
INJECTION, SOLUTION INTRAVENOUS AS NEEDED
Status: DISCONTINUED | OUTPATIENT
Start: 2021-03-12 | End: 2021-03-12 | Stop reason: SURG

## 2021-03-12 RX ORDER — FAMOTIDINE 10 MG/ML
INJECTION, SOLUTION INTRAVENOUS AS NEEDED
Status: DISCONTINUED | OUTPATIENT
Start: 2021-03-12 | End: 2021-03-12 | Stop reason: SURG

## 2021-03-12 RX ORDER — ROCURONIUM BROMIDE 10 MG/ML
INJECTION, SOLUTION INTRAVENOUS AS NEEDED
Status: DISCONTINUED | OUTPATIENT
Start: 2021-03-12 | End: 2021-03-12 | Stop reason: SURG

## 2021-03-12 RX ORDER — IPRATROPIUM BROMIDE AND ALBUTEROL SULFATE 2.5; .5 MG/3ML; MG/3ML
3 SOLUTION RESPIRATORY (INHALATION) ONCE AS NEEDED
Status: DISCONTINUED | OUTPATIENT
Start: 2021-03-12 | End: 2021-03-12 | Stop reason: HOSPADM

## 2021-03-12 RX ORDER — LIDOCAINE HYDROCHLORIDE 20 MG/ML
INJECTION, SOLUTION INFILTRATION; PERINEURAL AS NEEDED
Status: DISCONTINUED | OUTPATIENT
Start: 2021-03-12 | End: 2021-03-12 | Stop reason: SURG

## 2021-03-12 RX ORDER — HYDROCODONE BITARTRATE AND ACETAMINOPHEN 5; 325 MG/1; MG/1
1 TABLET ORAL EVERY 4 HOURS PRN
Qty: 15 TABLET | Refills: 0 | Status: SHIPPED | OUTPATIENT
Start: 2021-03-12 | End: 2021-03-22

## 2021-03-12 RX ADMIN — FENTANYL CITRATE 100 MCG: 50 INJECTION INTRAMUSCULAR; INTRAVENOUS at 10:04

## 2021-03-12 RX ADMIN — SODIUM CHLORIDE, POTASSIUM CHLORIDE, SODIUM LACTATE AND CALCIUM CHLORIDE 125 ML/HR: 600; 310; 30; 20 INJECTION, SOLUTION INTRAVENOUS at 09:33

## 2021-03-12 RX ADMIN — MIDAZOLAM 2 MG: 1 INJECTION INTRAMUSCULAR; INTRAVENOUS at 10:04

## 2021-03-12 RX ADMIN — DEXAMETHASONE SODIUM PHOSPHATE 8 MG: 4 INJECTION, SOLUTION INTRA-ARTICULAR; INTRALESIONAL; INTRAMUSCULAR; INTRAVENOUS; SOFT TISSUE at 10:11

## 2021-03-12 RX ADMIN — HYDROMORPHONE HYDROCHLORIDE 1 MCG: 2 INJECTION, SOLUTION INTRAMUSCULAR; INTRAVENOUS; SUBCUTANEOUS at 10:15

## 2021-03-12 RX ADMIN — NEOSTIGMINE 4 MG: 1 INJECTION INTRAVENOUS at 10:37

## 2021-03-12 RX ADMIN — MIDAZOLAM HYDROCHLORIDE 1 MG: 1 INJECTION, SOLUTION INTRAMUSCULAR; INTRAVENOUS at 09:33

## 2021-03-12 RX ADMIN — LIDOCAINE HYDROCHLORIDE 40 MG: 20 INJECTION, SOLUTION INFILTRATION; PERINEURAL at 10:04

## 2021-03-12 RX ADMIN — HYDROMORPHONE HYDROCHLORIDE 1 MCG: 2 INJECTION, SOLUTION INTRAMUSCULAR; INTRAVENOUS; SUBCUTANEOUS at 10:25

## 2021-03-12 RX ADMIN — ONDANSETRON 4 MG: 2 INJECTION INTRAMUSCULAR; INTRAVENOUS at 10:48

## 2021-03-12 RX ADMIN — SODIUM CHLORIDE, POTASSIUM CHLORIDE, SODIUM LACTATE AND CALCIUM CHLORIDE: 600; 310; 30; 20 INJECTION, SOLUTION INTRAVENOUS at 10:03

## 2021-03-12 RX ADMIN — ONDANSETRON 4 MG: 2 INJECTION INTRAMUSCULAR; INTRAVENOUS at 10:11

## 2021-03-12 RX ADMIN — PROPOFOL 200 MG: 10 INJECTION, EMULSION INTRAVENOUS at 10:11

## 2021-03-12 RX ADMIN — FENTANYL CITRATE 50 MCG: 50 INJECTION INTRAMUSCULAR; INTRAVENOUS at 11:15

## 2021-03-12 RX ADMIN — MIDAZOLAM HYDROCHLORIDE 1 MG: 1 INJECTION, SOLUTION INTRAMUSCULAR; INTRAVENOUS at 09:50

## 2021-03-12 RX ADMIN — ROCURONIUM BROMIDE 40 MG: 10 SOLUTION INTRAVENOUS at 10:11

## 2021-03-12 RX ADMIN — FAMOTIDINE 20 MG: 10 INJECTION INTRAVENOUS at 10:04

## 2021-03-12 RX ADMIN — DROPERIDOL 0.62 MG: 2.5 INJECTION, SOLUTION INTRAMUSCULAR; INTRAVENOUS at 11:08

## 2021-03-12 RX ADMIN — GLYCOPYRROLATE 0.8 MG: 0.2 INJECTION, SOLUTION INTRAMUSCULAR; INTRAVENOUS at 10:37

## 2021-03-12 NOTE — ANESTHESIA PROCEDURE NOTES
Airway  Urgency: elective    Date/Time: 3/12/2021 10:11 AM  Airway not difficult    General Information and Staff    Patient location during procedure: OR  Anesthesiologist: Vincent Gilmore MD  CRNA: Nellie Randall CRNA    Indications and Patient Condition  Indications for airway management: airway protection    Preoxygenated: yes  MILS maintained throughout  Mask difficulty assessment: 2 - vent by mask + OA or adjuvant +/- NMBA    Final Airway Details  Final airway type: endotracheal airway      Successful airway: ETT  Cuffed: yes   Successful intubation technique: direct laryngoscopy  Facilitating devices/methods: intubating stylet  Endotracheal tube insertion site: oral  Blade: Shari  Blade size: 4  ETT size (mm): 7.5  Cormack-Lehane Classification: grade IIa - partial view of glottis  Placement verified by: chest auscultation, capnometry and palpation of cuff   Cuff volume (mL): 6  Measured from: lips  ETT/EBT  to lips (cm): 22  Number of attempts at approach: 1  Assessment: lips, teeth, and gum same as pre-op and atraumatic intubation    Additional Comments  Dentition as preop. No complications noted. Patient tolerated well.  ETT secured.

## 2021-03-12 NOTE — ANESTHESIA PREPROCEDURE EVALUATION
Anesthesia Evaluation     no history of anesthetic complications:  NPO Solid Status: > 8 hours  NPO Liquid Status: > 8 hours           Airway   Mallampati: II  TM distance: >3 FB  Neck ROM: full  No difficulty expected  Dental - normal exam     Pulmonary - normal exam   Cardiovascular - normal exam    (+) hypertension, hyperlipidemia,       Neuro/Psych  GI/Hepatic/Renal/Endo    (+)  GI bleeding ,     Musculoskeletal     Abdominal  - normal exam   Substance History      OB/GYN          Other                        Anesthesia Plan    ASA 2     general     intravenous induction     Anesthetic plan, all risks, benefits, and alternatives have been provided, discussed and informed consent has been obtained with: patient.

## 2021-03-12 NOTE — ANESTHESIA POSTPROCEDURE EVALUATION
Patient: Mainor Max    Procedure Summary     Date: 03/12/21 Room / Location:  [facility] /  [facility]    Anesthesia Start: 1003 Anesthesia Stop: 1051    Procedures:       CHOLECYSTECTOMY LAPAROSCOPIC (N/A Abdomen)      COLONOSCOPY (N/A ) Diagnosis:       Dysfunctional gallbladder      Rectal bleeding      Functional diarrhea      (Dysfunctional gallbladder [K82.8])      (Rectal bleeding [K62.5])      (Functional diarrhea [K59.1])    Surgeons: Gael Dick MD Provider: Vincent Gilmore MD    Anesthesia Type: general ASA Status: 2          Anesthesia Type: general    Vitals  Vitals Value Taken Time   /67 03/12/21 1117   Temp 98.8 °F (37.1 °C) 03/12/21 1052   Pulse 62 03/12/21 1117   Resp 12 03/12/21 1117   SpO2 99 % 03/12/21 1117           Post Anesthesia Care and Evaluation    Patient location during evaluation: bedside  Patient participation: complete - patient participated  Level of consciousness: awake and alert  Pain score: 1  Pain management: adequate  Airway patency: patent  Anesthetic complications: No anesthetic complications  PONV Status: none  Cardiovascular status: acceptable  Respiratory status: acceptable  Hydration status: acceptable

## 2021-03-15 LAB — LAB AP CASE REPORT: NORMAL

## 2021-03-16 ENCOUNTER — BULK ORDERING (OUTPATIENT)
Dept: CASE MANAGEMENT | Facility: OTHER | Age: 31
End: 2021-03-16

## 2021-03-16 DIAGNOSIS — Z23 IMMUNIZATION DUE: ICD-10-CM

## 2021-03-25 ENCOUNTER — OFFICE VISIT (OUTPATIENT)
Dept: SURGERY | Facility: CLINIC | Age: 31
End: 2021-03-25

## 2021-03-25 VITALS — BODY MASS INDEX: 32.71 KG/M2 | HEIGHT: 77 IN | WEIGHT: 277 LBS

## 2021-03-25 DIAGNOSIS — Z90.49 STATUS POST LAPAROSCOPIC CHOLECYSTECTOMY: Primary | ICD-10-CM

## 2021-03-25 PROCEDURE — 99024 POSTOP FOLLOW-UP VISIT: CPT | Performed by: SURGERY

## 2025-06-12 NOTE — PROGRESS NOTES
Detail Level: Generalized Subjective   Mainor Max is a 30 y.o. male.     Chief Complaint: post lap iris    History of Present Illness He is doing well post lap iris.    The following portions of the patient's history were reviewed and updated as appropriate: current medications, past family history, past medical history, past social history, past surgical history and problem list.    Review of Systems wounds ok    Objective   Physical Exam wounds ok    Assessment/Plan   Diagnoses and all orders for this visit:    1. Status post laparoscopic cholecystectomy (Primary)           return prn   Detail Level: Simple Detail Level: Zone Detail Level: Detailed

## (undated) DEVICE — ENDOCUT SCISSOR TIP, DISPOSABLE: Brand: RENEW

## (undated) DEVICE — COR LAP CHOLE: Brand: MEDLINE INDUSTRIES, INC.

## (undated) DEVICE — Device

## (undated) DEVICE — ENDOPATH ELECTROSURGERY PROBE PLUS II PISTOL HAND CONTROL PISTOL GRIP HANDLES WITH SUCTION AND IRRRIGATION FOR HAND CONTROL MONOPOLAR ELCTROSURGERY USE ONLY WITH PROBE PLUS II SHAFTS: Brand: ENDOPATH

## (undated) DEVICE — TROCAR: Brand: KII FIOS FIRST ENTRY

## (undated) DEVICE — ENDOPATH ELECTROSURGERY PROBE PLUS II 5MM RIGHT ANGLE MONOPOLAR ELECTROSURGERY SUCTION AND IRRRIGATION SHAFTS WITH RIGHT ANGLE ELECTRODE - USE ONLY WITH PROBE PLUS II HANDLES: Brand: ENDOPATH

## (undated) DEVICE — HOLDER: Brand: DEROYAL

## (undated) DEVICE — APPL CHLORAPREP HI/LITE 26ML ORNG

## (undated) DEVICE — SUT VIC 2/0 UR6 27IN J602H

## (undated) DEVICE — DRAPE,UTILTY,TAPE,15X26, 4EA/PK: Brand: MEDLINE

## (undated) DEVICE — ENDOGATOR AUXILIARY WATER JET CONNECTOR: Brand: ENDOGATOR

## (undated) DEVICE — ANTIBACTERIAL UNDYED BRAIDED (POLYGLACTIN 910), SYNTHETIC ABSORBABLE SUTURE: Brand: COATED VICRYL

## (undated) DEVICE — PATIENT RETURN ELECTRODE, SINGLE-USE, CONTACT QUALITY MONITORING, ADULT, WITH 9FT CORD, FOR PATIENTS WEIGING OVER 33LBS. (15KG): Brand: MEGADYNE

## (undated) DEVICE — ENDOPATH XCEL BLADELESS TROCARS WITH STABILITY SLEEVES: Brand: ENDOPATH XCEL

## (undated) DEVICE — CONN Y IRR DISP 1P/U

## (undated) DEVICE — GLV SURG PREMIERPRO MIC LTX PF SZ7.5 BRN

## (undated) DEVICE — ENDOGATOR TUBING FOR ENDOGATOR EGP-100 IRRIGATION PUMP,OLYMPUS OFP PUMP, OLYMPUS AFU-100 PUMP AND ERBE EIP2 PUMP: Brand: ENDOGATOR

## (undated) DEVICE — CYSTO/BLADDER IRRIGATION SET, REGULATING CLAMP

## (undated) DEVICE — SYR LUERLOK 30CC

## (undated) DEVICE — [HIGH FLOW INSUFFLATOR,  DO NOT USE IF PACKAGE IS DAMAGED,  KEEP DRY,  KEEP AWAY FROM SUNLIGHT,  PROTECT FROM HEAT AND RADIOACTIVE SOURCES.]: Brand: PNEUMOSURE

## (undated) DEVICE — TROCAR: Brand: KII® SLEEVE